# Patient Record
Sex: FEMALE | Race: WHITE | NOT HISPANIC OR LATINO | Employment: PART TIME | URBAN - METROPOLITAN AREA
[De-identification: names, ages, dates, MRNs, and addresses within clinical notes are randomized per-mention and may not be internally consistent; named-entity substitution may affect disease eponyms.]

---

## 2024-05-03 ENCOUNTER — OFFICE VISIT (OUTPATIENT)
Dept: ORTHOPEDIC SURGERY | Facility: CLINIC | Age: 79
End: 2024-05-03
Payer: MEDICARE

## 2024-05-03 VITALS
HEART RATE: 56 BPM | DIASTOLIC BLOOD PRESSURE: 87 MMHG | OXYGEN SATURATION: 97 % | WEIGHT: 138.8 LBS | HEIGHT: 59 IN | SYSTOLIC BLOOD PRESSURE: 138 MMHG | BODY MASS INDEX: 27.98 KG/M2

## 2024-05-03 DIAGNOSIS — M25.561 RIGHT KNEE PAIN, UNSPECIFIED CHRONICITY: Primary | ICD-10-CM

## 2024-05-03 DIAGNOSIS — M17.11 OSTEOARTHRITIS OF RIGHT KNEE, UNSPECIFIED OSTEOARTHRITIS TYPE: ICD-10-CM

## 2024-05-03 DIAGNOSIS — M25.562 LEFT KNEE PAIN, UNSPECIFIED CHRONICITY: ICD-10-CM

## 2024-05-03 RX ORDER — MAGNESIUM 200 MG
200 TABLET ORAL DAILY
COMMUNITY

## 2024-05-03 RX ORDER — ERGOCALCIFEROL 1.25 MG/1
50000 CAPSULE ORAL WEEKLY
COMMUNITY

## 2024-05-03 RX ORDER — FAMOTIDINE 40 MG/1
40 TABLET, FILM COATED ORAL DAILY
COMMUNITY

## 2024-05-03 RX ORDER — LANSOPRAZOLE 30 MG/1
30 CAPSULE, DELAYED RELEASE ORAL DAILY
COMMUNITY

## 2024-05-03 RX ORDER — CALCIUM POLYCARBOPHIL 625 MG
625 TABLET ORAL DAILY
COMMUNITY

## 2024-05-03 RX ORDER — MULTIPLE VITAMINS W/ MINERALS TAB 9MG-400MCG
1 TAB ORAL DAILY
COMMUNITY

## 2024-05-03 RX ORDER — LIDOCAINE HYDROCHLORIDE 10 MG/ML
5 INJECTION, SOLUTION INFILTRATION; PERINEURAL
Status: COMPLETED | OUTPATIENT
Start: 2024-05-03 | End: 2024-05-03

## 2024-05-03 RX ORDER — TRIAMCINOLONE ACETONIDE 40 MG/ML
40 INJECTION, SUSPENSION INTRA-ARTICULAR; INTRAMUSCULAR
Status: COMPLETED | OUTPATIENT
Start: 2024-05-03 | End: 2024-05-03

## 2024-05-03 RX ORDER — L.ACID,CASEI/B.ANIMAL/S.THERMO 16B CELL
1 CAPSULE ORAL DAILY
COMMUNITY

## 2024-05-03 RX ORDER — LANOLIN ALCOHOL/MO/W.PET/CERES
1000 CREAM (GRAM) TOPICAL DAILY
COMMUNITY

## 2024-05-03 RX ORDER — ASCORBIC ACID 500 MG
500 TABLET ORAL DAILY
COMMUNITY

## 2024-05-03 RX ORDER — TRIAMTERENE AND HYDROCHLOROTHIAZIDE 37.5; 25 MG/1; MG/1
1 CAPSULE ORAL EVERY MORNING
COMMUNITY

## 2024-05-03 RX ADMIN — TRIAMCINOLONE ACETONIDE 40 MG: 40 INJECTION, SUSPENSION INTRA-ARTICULAR; INTRAMUSCULAR at 14:48

## 2024-05-03 RX ADMIN — LIDOCAINE HYDROCHLORIDE 5 ML: 10 INJECTION, SOLUTION INFILTRATION; PERINEURAL at 14:48

## 2024-05-03 NOTE — PROGRESS NOTES
"Chief Complaint  Pain and Initial Evaluation of the Right Knee and Initial Evaluation of the Left Knee     Subjective      Karla Wilkinson presents to [facility] for initial evaluation of the right knee. She has pain and the knee is trying to give out on her.  She had an injection a couple of weeks ago that gave little relief from a orthopedic in [place] and was not pleased with her care.  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  She has had no recent injury or fall. She notes the left knee is almost as bad as the right knee.     Allergies   Allergen Reactions    Decongestant [Pseudoephedrine] Palpitations    Sulfa Antibiotics Swelling        Social History     Socioeconomic History    Marital status:    Tobacco Use    Smoking status: Never    Smokeless tobacco: Never   Vaping Use    Vaping status: Never Used   Substance and Sexual Activity    Alcohol use: Never    Drug use: Never    Sexual activity: Defer        I reviewed the patient's chief complaint, history of present illness, review of systems, past medical history, surgical history, family history, social history, medications, and allergy list.     Review of Systems     Constitutional: Denies fevers, chills, weight loss  Cardiovascular: Denies chest pain, shortness of breath  Skin: Denies rashes, acute skin changes  Neurologic: Denies headache, loss of consciousness        Vital Signs:   /87   Pulse 56   Ht 149.9 cm (59\")   Wt 63 kg (138 lb 12.8 oz)   SpO2 97%   BMI 28.03 kg/m²          Physical Exam  General: Alert. No acute distress    Ortho Exam        BILATERAL KNEES Flexion 120. Extension 0. Stable to varus/valgus stress. Stable to anterior/posterior drawer. Neurovascularly intact. Negative Shahrzad. Negative Lachman. Positive EHL, FHL, HS and TA. Sensation intact to light touch all 5 nerves of the foot. Ambulates with Antalgic gait. Patella is well tracking. Calf supple, non-tender. " Positive tenderness to the medial joint line. Positive tenderness to the lateral joint line. Negative Crepitus. Good strength to hamstrings, quadriceps, dorsiflexors, and plantar flexors.  Knee Extensor Mechanism intact Varus deformity.       Large Joint Arthrocentesis: L knee  Date/Time: 5/3/2024 2:48 PM  Consent given by: patient  Site marked: site marked  Timeout: Immediately prior to procedure a time out was called to verify the correct patient, procedure, equipment, support staff and site/side marked as required   Supporting Documentation  Indications: pain   Procedure Details  Location: knee - L knee  Preparation: Patient was prepped and draped in the usual sterile fashion  Needle gauge: 21G.  Medications administered: 5 mL lidocaine 1 %; 40 mg triamcinolone acetonide 40 MG/ML  Patient tolerance: patient tolerated the procedure well with no immediate complications    This injection documentation was Scribed for Miguelangel Vegas MD by Jena Matt.  05/03/24   14:49 EDT        Imaging Results (Most Recent)       Procedure Component Value Units Date/Time    XR Knee 3 View Right [[id number]] Resulted: 05/03/24 1405     Updated: 05/03/24 1407             Result Review :     X-Ray Report:  Right knee X-Ray  Indication: Evaluation of the right knee  AP/Lateral and [place] view(s)  Findings: Moderately advanced arthritis of the lateral joint line with joint space narrowing.  No acute fracture or dislocation.   Prior studies available for comparison: no           Assessment and Plan     Diagnoses and all orders for this visit:    1. Right knee pain, unspecified chronicity (Primary)  -     XR Knee 3 View Right    2. Osteoarthritis of right knee, unspecified osteoarthritis type    3. Left knee pain, unspecified chronicity        Discussed the treatment plan with the patient. I reviewed the X-rays that were obtained today with the patient.     Discussed the treatment options with the patient, operative vs  non-operative. The patient is a candidate for a right total knee arthroplasty whenever she is ready.     Discussed the risks and benefits of conservative measures. The patient expressed understanding and wished to proceed with a left knee steroid injection.  She tolerated the injection well.     Will X Ray the left knee at the next visit.     Call or return if worsening symptoms.    Follow Up     Schedule for the end of May with X ray of the left knee and discuss total knee arthroplasty of 1 or both knees.       Patient was given instructions and counseling regarding her condition or for health maintenance advice. Please see specific information pulled into the AVS if appropriate.     Scribed for Miguelangel Vegas MD by Mable Rivera MA.  05/03/24   14:01 EDT      I have personally performed the services described in this document as scribed by the above individual and it is both accurate and complete. Miguelangel Vegas MD 05/03/24

## 2024-05-31 ENCOUNTER — OFFICE VISIT (OUTPATIENT)
Dept: ORTHOPEDIC SURGERY | Facility: CLINIC | Age: 79
End: 2024-05-31
Payer: MEDICARE

## 2024-05-31 VITALS
SYSTOLIC BLOOD PRESSURE: 143 MMHG | OXYGEN SATURATION: 99 % | DIASTOLIC BLOOD PRESSURE: 89 MMHG | WEIGHT: 138 LBS | HEIGHT: 59 IN | BODY MASS INDEX: 27.82 KG/M2 | HEART RATE: 70 BPM

## 2024-05-31 DIAGNOSIS — M17.0 OSTEOARTHRITIS OF BOTH KNEES, UNSPECIFIED OSTEOARTHRITIS TYPE: Primary | ICD-10-CM

## 2024-05-31 NOTE — PROGRESS NOTES
"Chief Complaint  Follow-up of the Right Knee and Follow-up of the Left Knee     Subjective      Karla Wilkinson presents to [facility] for follow up of the right knee.   She had an injection of the left knee on 5/3/24.  We discussed X ray and discuss surgical intervention.  She notes she is doing well and the injections were helpful.  She has been having no pain since injections.      Allergies   Allergen Reactions    Decongestant [Pseudoephedrine] Palpitations    Sulfa Antibiotics Swelling        Social History     Socioeconomic History    Marital status:    Tobacco Use    Smoking status: Never    Smokeless tobacco: Never   Vaping Use    Vaping status: Never Used   Substance and Sexual Activity    Alcohol use: Never    Drug use: Never    Sexual activity: Defer        I reviewed the patient's chief complaint, history of present illness, review of systems, past medical history, surgical history, family history, social history, medications, and allergy list.     Review of Systems     Constitutional: Denies fevers, chills, weight loss  Cardiovascular: Denies chest pain, shortness of breath  Skin: Denies rashes, acute skin changes  Neurologic: Denies headache, loss of consciousness        Vital Signs:   /89   Pulse 70   Ht 149.9 cm (59\")   Wt 62.6 kg (138 lb)   SpO2 99%   BMI 27.87 kg/m²          Physical Exam  General: Alert. No acute distress    Ortho Exam        BILATERAL KNEES Flexion 120. Extension 0. Stable to varus/valgus stress. Stable to anterior/posterior drawer. Neurovascularly intact. Negative Shahrzad. Negative Lachman. Positive EHL, FHL, HS and TA. Sensation intact to light touch all 5 nerves of the foot. Ambulates with Antalgic gait. Patella is well tracking. Calf supple, non-tender. Positive tenderness to the medial joint line. Positive tenderness to the lateral joint line. Negative Crepitus. Good strength to hamstrings, quadriceps, dorsiflexors, and plantar " flexors.  Knee Extensor Mechanism intact Varus deformity.       Procedures        Imaging Results (Most Recent)       None             Result Review :         XR Knee 3 View Right    Result Date: 5/3/2024  Narrative: X-Ray Report: Right knee X-Ray Indication: Evaluation of the right knee AP/Lateral and [place] view(s) Findings: Moderately advanced arthritis of the lateral joint line with joint space narrowing.  No acute fracture or dislocation. Prior studies available for comparison: no             Assessment and Plan     Diagnoses and all orders for this visit:    1. Osteoarthritis of both knees, unspecified osteoarthritis type (Primary)        Discussed the treatment plan with the patient.     Discussed the treatment options with the patient, operative vs non-operative. The patient is a candidate for a total knee arthroplasty of either knee whenever she is ready.      If she is here to discuss surgery will need X ray of whatever knee she wants surgery for.     Call or return if worsening symptoms.    Follow Up     PRN      Patient was given instructions and counseling regarding her condition or for health maintenance advice. Please see specific information pulled into the AVS if appropriate.     Scribed for Miguelangel Vegas MD by Mable Rivera MA.  05/31/24   14:47 EDT    I have personally performed the services described in this document as scribed by the above individual and it is both accurate and complete. Miguelangel Vegas MD 05/31/24